# Patient Record
Sex: FEMALE | Race: WHITE | NOT HISPANIC OR LATINO | Employment: UNEMPLOYED | URBAN - METROPOLITAN AREA
[De-identification: names, ages, dates, MRNs, and addresses within clinical notes are randomized per-mention and may not be internally consistent; named-entity substitution may affect disease eponyms.]

---

## 2021-03-19 ENCOUNTER — IMMUNIZATION (OUTPATIENT)
Dept: PHARMACY | Facility: CLINIC | Age: 51
End: 2021-03-19

## 2021-03-19 DIAGNOSIS — Z23 NEED FOR VACCINATION: Primary | ICD-10-CM

## 2021-04-16 ENCOUNTER — IMMUNIZATION (OUTPATIENT)
Dept: PHARMACY | Facility: CLINIC | Age: 51
End: 2021-04-16

## 2021-04-16 DIAGNOSIS — Z23 NEED FOR VACCINATION: Primary | ICD-10-CM

## 2022-03-22 ENCOUNTER — HOSPITAL ENCOUNTER (EMERGENCY)
Facility: HOSPITAL | Age: 52
Discharge: HOME | End: 2022-03-22
Attending: EMERGENCY MEDICINE
Payer: OTHER GOVERNMENT

## 2022-03-22 VITALS
WEIGHT: 152 LBS | RESPIRATION RATE: 18 BRPM | OXYGEN SATURATION: 99 % | DIASTOLIC BLOOD PRESSURE: 67 MMHG | SYSTOLIC BLOOD PRESSURE: 136 MMHG | HEART RATE: 62 BPM | TEMPERATURE: 98 F | HEIGHT: 61 IN | BODY MASS INDEX: 28.7 KG/M2

## 2022-03-22 DIAGNOSIS — N93.9 VAGINAL BLEEDING: ICD-10-CM

## 2022-03-22 DIAGNOSIS — R05.9 COUGH: ICD-10-CM

## 2022-03-22 DIAGNOSIS — R42 DIZZINESS: ICD-10-CM

## 2022-03-22 DIAGNOSIS — J32.9 SINUSITIS, UNSPECIFIED CHRONICITY, UNSPECIFIED LOCATION: Primary | ICD-10-CM

## 2022-03-22 LAB
ALBUMIN SERPL BCP-MCNC: 4.1 G/DL (ref 3.5–5.2)
ALP SERPL-CCNC: 60 U/L (ref 55–135)
ALT SERPL W/O P-5'-P-CCNC: 22 U/L (ref 10–44)
ANION GAP SERPL CALC-SCNC: 10 MMOL/L (ref 8–16)
AST SERPL-CCNC: 28 U/L (ref 10–40)
B-HCG UR QL: NEGATIVE
BASOPHILS # BLD AUTO: 0.06 K/UL (ref 0–0.2)
BASOPHILS NFR BLD: 0.5 % (ref 0–1.9)
BILIRUB SERPL-MCNC: 0.2 MG/DL (ref 0.1–1)
BILIRUB UR QL STRIP: NEGATIVE
BNP SERPL-MCNC: 90 PG/ML (ref 0–99)
BUN SERPL-MCNC: 11 MG/DL (ref 6–20)
CALCIUM SERPL-MCNC: 8.9 MG/DL (ref 8.7–10.5)
CHLORIDE SERPL-SCNC: 104 MMOL/L (ref 95–110)
CLARITY UR: CLEAR
CO2 SERPL-SCNC: 25 MMOL/L (ref 23–29)
COLOR UR: COLORLESS
CREAT SERPL-MCNC: 0.7 MG/DL (ref 0.5–1.4)
CTP QC/QA: YES
DIFFERENTIAL METHOD: ABNORMAL
EOSINOPHIL # BLD AUTO: 0.2 K/UL (ref 0–0.5)
EOSINOPHIL NFR BLD: 1.8 % (ref 0–8)
ERYTHROCYTE [DISTWIDTH] IN BLOOD BY AUTOMATED COUNT: 13.4 % (ref 11.5–14.5)
EST. GFR  (AFRICAN AMERICAN): >60 ML/MIN/1.73 M^2
EST. GFR  (NON AFRICAN AMERICAN): >60 ML/MIN/1.73 M^2
GLUCOSE SERPL-MCNC: 98 MG/DL (ref 70–110)
GLUCOSE UR QL STRIP: NEGATIVE
HCT VFR BLD AUTO: 39.6 % (ref 37–48.5)
HGB BLD-MCNC: 12.7 G/DL (ref 12–16)
HGB UR QL STRIP: ABNORMAL
IMM GRANULOCYTES # BLD AUTO: 0.03 K/UL (ref 0–0.04)
IMM GRANULOCYTES NFR BLD AUTO: 0.2 % (ref 0–0.5)
KETONES UR QL STRIP: NEGATIVE
LEUKOCYTE ESTERASE UR QL STRIP: NEGATIVE
LYMPHOCYTES # BLD AUTO: 1.4 K/UL (ref 1–4.8)
LYMPHOCYTES NFR BLD: 11.1 % (ref 18–48)
MCH RBC QN AUTO: 26.7 PG (ref 27–31)
MCHC RBC AUTO-ENTMCNC: 32.1 G/DL (ref 32–36)
MCV RBC AUTO: 83 FL (ref 82–98)
MICROSCOPIC COMMENT: NORMAL
MONOCYTES # BLD AUTO: 0.6 K/UL (ref 0.3–1)
MONOCYTES NFR BLD: 4.6 % (ref 4–15)
NEUTROPHILS # BLD AUTO: 10.4 K/UL (ref 1.8–7.7)
NEUTROPHILS NFR BLD: 81.8 % (ref 38–73)
NITRITE UR QL STRIP: NEGATIVE
NRBC BLD-RTO: 0 /100 WBC
PH UR STRIP: 6 [PH] (ref 5–8)
PLATELET # BLD AUTO: 268 K/UL (ref 150–450)
PMV BLD AUTO: 11 FL (ref 9.2–12.9)
POC MOLECULAR INFLUENZA A AGN: NEGATIVE
POC MOLECULAR INFLUENZA B AGN: NEGATIVE
POTASSIUM SERPL-SCNC: 4.2 MMOL/L (ref 3.5–5.1)
PROT SERPL-MCNC: 8 G/DL (ref 6–8.4)
PROT UR QL STRIP: NEGATIVE
RBC # BLD AUTO: 4.76 M/UL (ref 4–5.4)
RBC #/AREA URNS HPF: 1 /HPF (ref 0–4)
SARS-COV-2 RDRP RESP QL NAA+PROBE: NEGATIVE
SODIUM SERPL-SCNC: 139 MMOL/L (ref 136–145)
SP GR UR STRIP: 1 (ref 1–1.03)
SQUAMOUS #/AREA URNS HPF: 2 /HPF
TROPONIN I SERPL DL<=0.01 NG/ML-MCNC: 0.01 NG/ML (ref 0–0.03)
URN SPEC COLLECT METH UR: ABNORMAL
UROBILINOGEN UR STRIP-ACNC: NEGATIVE EU/DL
WBC # BLD AUTO: 12.71 K/UL (ref 3.9–12.7)

## 2022-03-22 PROCEDURE — 87502 INFLUENZA DNA AMP PROBE: CPT

## 2022-03-22 PROCEDURE — 93005 ELECTROCARDIOGRAM TRACING: CPT

## 2022-03-22 PROCEDURE — 84484 ASSAY OF TROPONIN QUANT: CPT | Performed by: PHYSICIAN ASSISTANT

## 2022-03-22 PROCEDURE — 81025 URINE PREGNANCY TEST: CPT | Performed by: PHYSICIAN ASSISTANT

## 2022-03-22 PROCEDURE — 80053 COMPREHEN METABOLIC PANEL: CPT | Performed by: PHYSICIAN ASSISTANT

## 2022-03-22 PROCEDURE — U0002 COVID-19 LAB TEST NON-CDC: HCPCS | Performed by: PHYSICIAN ASSISTANT

## 2022-03-22 PROCEDURE — 96360 HYDRATION IV INFUSION INIT: CPT

## 2022-03-22 PROCEDURE — 25000003 PHARM REV CODE 250: Performed by: PHYSICIAN ASSISTANT

## 2022-03-22 PROCEDURE — 83880 ASSAY OF NATRIURETIC PEPTIDE: CPT | Performed by: PHYSICIAN ASSISTANT

## 2022-03-22 PROCEDURE — 81000 URINALYSIS NONAUTO W/SCOPE: CPT | Performed by: PHYSICIAN ASSISTANT

## 2022-03-22 PROCEDURE — 99285 EMERGENCY DEPT VISIT HI MDM: CPT | Mod: 25

## 2022-03-22 PROCEDURE — 93010 ELECTROCARDIOGRAM REPORT: CPT | Mod: ,,, | Performed by: INTERNAL MEDICINE

## 2022-03-22 PROCEDURE — 85025 COMPLETE CBC W/AUTO DIFF WBC: CPT | Performed by: PHYSICIAN ASSISTANT

## 2022-03-22 PROCEDURE — 93010 EKG 12-LEAD: ICD-10-PCS | Mod: ,,, | Performed by: INTERNAL MEDICINE

## 2022-03-22 RX ORDER — AMOXICILLIN AND CLAVULANATE POTASSIUM 875; 125 MG/1; MG/1
1 TABLET, FILM COATED ORAL 2 TIMES DAILY
Qty: 14 TABLET | Refills: 0 | Status: SHIPPED | OUTPATIENT
Start: 2022-03-22

## 2022-03-22 RX ADMIN — SODIUM CHLORIDE 1000 ML: 0.9 INJECTION, SOLUTION INTRAVENOUS at 12:03

## 2022-03-22 NOTE — ED TRIAGE NOTES
Pt. Came in complaining of dizziness that started at approximately 0845 this morning. Pt. States that she was watching the kids at the  when her dizziness started. Pt. Complains of menstrual pain. Pt. States that she is on her cycle and is bleeding heavier than normal. Pt. States that she does have fibroids and is due to have the surgery next month.

## 2022-03-22 NOTE — ED PROVIDER NOTES
Encounter Date: 3/22/2022    SCRIBE #1 NOTE: I, Leyla Stephens, am scribing for, and in the presence of,  Lola Berry PA-C. I have scribed the following portions of the note - Other sections scribed: HPI, ROS.       History     Chief Complaint   Patient presents with    Dizziness     Started this am    Cough     Nasal congestion     This 51 y.o female, with no medical history, presents to the ED c/o acute dizziness that began at 8:45 am this morning. Pt reports feeling as though she is going to pass out, noting that it has been constant since onset. She states that she has been experiencing vaginal bleeding intermittently every 2 weeks since 2021, and notes that the bleeding is typically heavy (saturating sanitary pads every 1.5 hours) for one day and improves the following day, but returns. The bleeding is improved today. She notes that she was recently found to have fibroids and is scheduled to undergo surgery to have them removed on 2022. Additionally, pt c/o a productive cough with green sputum, rhinorrhea, nasal congestion, headache and mild shortness of breath for the last 5 days. She reports taking Zyrtec and Claritin for treatment with minimal improvement. Pt notes that she usually experiences allergy issues with a change in weather. No recent sick contacts. She is vaccinated for COVID-19. She denies fever, appetite change, ear pain, chest pain, abdominal pain, nausea, or emesis. No other associated symptoms.     The history is provided by the patient.     Review of patient's allergies indicates:  No Known Allergies  History reviewed. No pertinent past medical history.  Past Surgical History:   Procedure Laterality Date     SECTION, CLASSIC  2010     History reviewed. No pertinent family history.  Social History     Tobacco Use    Smoking status: Never Smoker   Substance Use Topics    Alcohol use: No    Drug use: No     Review of Systems   Constitutional: Negative for  appetite change and fever.   HENT: Positive for congestion (nasal) and rhinorrhea.    Respiratory: Positive for cough (productive with green sputum) and shortness of breath (mild).    Cardiovascular: Negative for chest pain.   Gastrointestinal: Negative for abdominal pain, nausea and vomiting.   Genitourinary: Positive for vaginal bleeding.   Neurological: Positive for dizziness and headaches.       Physical Exam     Initial Vitals [03/22/22 1140]   BP Pulse Resp Temp SpO2   (!) 168/74 73 18 98.2 °F (36.8 °C) 99 %      MAP       --         Physical Exam    ED Course   Procedures  Labs Reviewed   CBC W/ AUTO DIFFERENTIAL - Abnormal; Notable for the following components:       Result Value    WBC 12.71 (*)     MCH 26.7 (*)     Gran # (ANC) 10.4 (*)     Gran % 81.8 (*)     Lymph % 11.1 (*)     All other components within normal limits   URINALYSIS, REFLEX TO URINE CULTURE - Abnormal; Notable for the following components:    Color, UA Colorless (*)     Occult Blood UA 3+ (*)     All other components within normal limits    Narrative:     Specimen Source->Urine   COMPREHENSIVE METABOLIC PANEL   TROPONIN I   B-TYPE NATRIURETIC PEPTIDE   URINALYSIS MICROSCOPIC    Narrative:     Specimen Source->Urine   SARS-COV-2 RDRP GENE   POCT URINE PREGNANCY   POCT INFLUENZA A/B MOLECULAR   SARS-COV-2 RDRP GENE     EKG Readings: (Independently Interpreted)   Initial Reading: No STEMI. Rhythm: Normal Sinus Rhythm. Heart Rate: 69. Ectopy: No Ectopy. Conduction: Normal.       Imaging Results          X-Ray Chest PA And Lateral (Final result)  Result time 03/22/22 12:25:21    Final result by Rob Núñez MD (03/22/22 12:25:21)                 Impression:      No acute process.      Electronically signed by: Rob Núñez MD  Date:    03/22/2022  Time:    12:25             Narrative:    EXAMINATION:  XR CHEST PA AND LATERAL    CLINICAL HISTORY:  Cough, unspecified    TECHNIQUE:  PA and lateral views of the chest were  performed.    COMPARISON:  None    FINDINGS:  The trachea is unremarkable.  The cardiomediastinal silhouette is within normal limits.  The hemidiaphragms are unremarkable.  There are no pleural effusions.  There is no evidence of a pneumothorax.  There is no evidence of pneumomediastinum.  No airspace opacity is present.  The osseous structures are unremarkable.                                 Medications   sodium chloride 0.9% bolus 1,000 mL (0 mLs Intravenous Stopped 3/22/22 1330)           APC / Resident Notes:   This is an urgent evaluation of a 51-year-old female who presents to the emergency department complaining of dizziness, vaginal bleeding and upper respiratory symptoms.    The patient is currently afebrile and nontoxic in appearance.  Vital signs are stable.  On physical exam, there is bilateral nasal congestion noted.  There is mild posterior pharyngeal erythema without tonsillar exudates.  There is no evidence of peritonsillar abscess.  The bilateral lungs are clear to auscultation.  There is no abdominal tenderness, rebound or guarding.  The patient deferred a pelvic exam reporting that vaginal bleeding improved.    Orthostatic blood pressures were performed which were within normal limits.  Rapid influenza and COVID screening were done which were negative.  Pregnancy test is negative.  CBC is without any anemia.  Her white blood cell count is mildly elevated at 12.71.  BNP and troponin are negative.  Chest x-ray revealed no acute cardiopulmonary processes.  EKG was performed which revealed a ventricular rate of 69 with a normal sinus rhythm.  No evidence of ST elevation MI.  urinalysis revealed no evidence of urinary tract infection.    At this time, no anemia is noted for a potential transfusion.  I will treat her sinusitis with antibiotics.  Her dizziness is likely due to an upper respiratory infection and not cardiac in nature.  Signs and symptoms of worsening were thoroughly reviewed with her and  she verbalized understanding and agreement.  She is currently safe and stable for discharge at this time.     Scribe Attestation:   Scribe #1: I performed the above scribed service and the documentation accurately describes the services I performed. I attest to the accuracy of the note.                 Clinical Impression:   Final diagnoses:  [R42] Dizziness  [R05.9] Cough  [J32.9] Sinusitis, unspecified chronicity, unspecified location (Primary)  [N93.9] Vaginal bleeding          ED Disposition Condition    Discharge Stable        ED Prescriptions     Medication Sig Dispense Start Date End Date Auth. Provider    amoxicillin-clavulanate 875-125mg (AUGMENTIN) 875-125 mg per tablet Take 1 tablet by mouth 2 (two) times daily. 14 tablet 3/22/2022  Lola Berry PA-C        Follow-up Information    None          I, Lola Berry PA-C, personally performed the services described in this documentation. All medical record entries made by the scribe were at my direction and in my presence. I have reviewed the chart and agree that the record reflects my personal performance and is accurate and complete.     Lola Berry PA-C  03/22/22 7014

## 2022-03-22 NOTE — DISCHARGE INSTRUCTIONS
Drink plenty of fluids.  Rest.  Take all antibiotics as prescribed until completed.  Return to the emergency department for any change or concerning symptoms.  Please call make a follow-up appointment with your primary care doctor.

## 2024-02-13 ENCOUNTER — HOSPITAL ENCOUNTER (EMERGENCY)
Facility: HOSPITAL | Age: 54
Discharge: HOME | End: 2024-02-13
Attending: EMERGENCY MEDICINE
Payer: OTHER GOVERNMENT

## 2024-02-13 VITALS
SYSTOLIC BLOOD PRESSURE: 132 MMHG | WEIGHT: 156 LBS | OXYGEN SATURATION: 99 % | HEART RATE: 71 BPM | TEMPERATURE: 99 F | RESPIRATION RATE: 18 BRPM | BODY MASS INDEX: 29.45 KG/M2 | DIASTOLIC BLOOD PRESSURE: 67 MMHG | HEIGHT: 61 IN

## 2024-02-13 DIAGNOSIS — M54.50 ACUTE RIGHT-SIDED LOW BACK PAIN WITHOUT SCIATICA: Primary | ICD-10-CM

## 2024-02-13 LAB
BILIRUB UR QL STRIP: NEGATIVE
CLARITY UR: CLEAR
COLOR UR: YELLOW
GLUCOSE UR QL STRIP: NEGATIVE
HGB UR QL STRIP: NEGATIVE
KETONES UR QL STRIP: NEGATIVE
LEUKOCYTE ESTERASE UR QL STRIP: NEGATIVE
NITRITE UR QL STRIP: NEGATIVE
PH UR STRIP: 5 [PH] (ref 5–8)
PROT UR QL STRIP: NEGATIVE
SP GR UR STRIP: 1.01 (ref 1–1.03)
URN SPEC COLLECT METH UR: NORMAL
UROBILINOGEN UR STRIP-ACNC: NEGATIVE EU/DL

## 2024-02-13 PROCEDURE — 81003 URINALYSIS AUTO W/O SCOPE: CPT

## 2024-02-13 PROCEDURE — 25000003 PHARM REV CODE 250

## 2024-02-13 PROCEDURE — 99284 EMERGENCY DEPT VISIT MOD MDM: CPT

## 2024-02-13 RX ORDER — MELOXICAM 15 MG/1
15 TABLET ORAL DAILY
Qty: 20 TABLET | Refills: 0 | Status: SHIPPED | OUTPATIENT
Start: 2024-02-13

## 2024-02-13 RX ORDER — CYCLOBENZAPRINE HCL 5 MG
5 TABLET ORAL 3 TIMES DAILY PRN
Qty: 20 TABLET | Refills: 0 | Status: SHIPPED | OUTPATIENT
Start: 2024-02-13 | End: 2024-02-23

## 2024-02-13 RX ORDER — LIDOCAINE 50 MG/G
1 PATCH TOPICAL ONCE
Qty: 15 PATCH | Refills: 0 | Status: SHIPPED | OUTPATIENT
Start: 2024-02-13 | End: 2024-02-13

## 2024-02-13 RX ORDER — MELOXICAM 7.5 MG/1
15 TABLET ORAL
Status: COMPLETED | OUTPATIENT
Start: 2024-02-13 | End: 2024-02-13

## 2024-02-13 RX ADMIN — MELOXICAM 15 MG: 7.5 TABLET ORAL at 12:02

## 2024-02-13 NOTE — ED PROVIDER NOTES
Encounter Date: 2024       History     Chief Complaint   Patient presents with    Back Pain     Pt reporting right lower back pain x Tuesday last week with no relief from Tylenol. Pt denies  changes.      53 y.o. female presenting with one week history of constant 5/10 atraumatic right lower back pain. Pain is worse with movement.  She denies any associated trauma, fall, head trauma, loss of consciousness.  Patient reports she works at a  and has to  toddlers. No fever, chest pain, nausea, vomiting, dysuria, vaginal discharge, saddle anesthesia, urinary/bowel incontinence or further complaints. No IVDU. No recent steroid use. No hx of cancer. Patient attempted treatment with Tylenol and lidocaine patch with some relief.     The history is provided by the patient. No  was used.     Review of patient's allergies indicates:  No Known Allergies  History reviewed. No pertinent past medical history.  Past Surgical History:   Procedure Laterality Date     SECTION, CLASSIC  2010     No family history on file.  Social History     Tobacco Use    Smoking status: Never   Substance Use Topics    Alcohol use: No    Drug use: No     Review of Systems   Constitutional:  Negative for chills and fever.   HENT:  Negative for congestion, ear pain, rhinorrhea and sore throat.    Eyes:  Negative for redness.   Respiratory:  Negative for cough and shortness of breath.    Cardiovascular:  Negative for chest pain.   Gastrointestinal:  Negative for abdominal pain, diarrhea, nausea and vomiting.   Genitourinary:  Negative for decreased urine volume, difficulty urinating, dysuria, frequency, hematuria and urgency.        (-) bladder/bowel incontinence  (-) saddle anesthesia   Musculoskeletal:  Positive for back pain. Negative for neck pain.   Skin:  Negative for rash.   Neurological:  Negative for dizziness, weakness, numbness and headaches.        (-) head trauma  (-) LOC   Hematological:  Does  not bruise/bleed easily.   Psychiatric/Behavioral:  Negative for confusion.        Physical Exam     Initial Vitals [02/13/24 1049]   BP Pulse Resp Temp SpO2   (!) 140/61 70 18 98.8 °F (37.1 °C) 100 %      MAP       --         Physical Exam    Nursing note and vitals reviewed.  Constitutional: She appears well-developed and well-nourished.  Non-toxic appearance. She does not appear ill.   HENT:   Head: Normocephalic and atraumatic.   Right Ear: Hearing, tympanic membrane, external ear and ear canal normal. Tympanic membrane is not perforated, not erythematous and not bulging.   Left Ear: Hearing, tympanic membrane, external ear and ear canal normal. Tympanic membrane is not perforated, not erythematous and not bulging.   Nose: Nose normal.   Mouth/Throat: Uvula is midline, oropharynx is clear and moist and mucous membranes are normal.   Eyes: Conjunctivae and EOM are normal. Pupils are equal, round, and reactive to light.   Neck: Neck supple.   Normal range of motion.   Full passive range of motion without pain.     Cardiovascular:  Normal rate, regular rhythm and normal heart sounds.           Pulses:       Radial pulses are 2+ on the right side and 2+ on the left side.   Pulmonary/Chest: Effort normal and breath sounds normal. No accessory muscle usage. No respiratory distress. She has no decreased breath sounds.   Abdominal: Abdomen is soft. Bowel sounds are normal. She exhibits no distension. There is no abdominal tenderness.   No right CVA tenderness.  No left CVA tenderness. There is no rebound and no guarding.   Musculoskeletal:         General: Normal range of motion.      Cervical back: Full passive range of motion without pain, normal range of motion and neck supple. No rigidity.      Comments: Full ROM of neck. No neck rigidity. No midline tenderness to cervical, thoracic, or lumbar spine.  No bony step-offs.  Full range of motion of bilateral upper and lower extremities.  Strength and sensation intact  bilateral upper and lower extremities.  Patient able to ambulate into the room.  No erythema, edema, bruising, rash, or cellulitis patient's back.      Neurological: She is alert and oriented to person, place, and time. She has normal strength. No cranial nerve deficit.   Neuro intact.  Strength and sensation intact bilateral upper and lower extremities.   Skin: Skin is warm and dry.   Psychiatric: She has a normal mood and affect. Her behavior is normal. Judgment and thought content normal.         ED Course   Procedures  Labs Reviewed   URINALYSIS, REFLEX TO URINE CULTURE    Narrative:     Specimen Source->Urine          Imaging Results    None          Medications   meloxicam tablet 15 mg (15 mg Oral Given 2/13/24 9198)     Medical Decision Making  This is a 53 y.o. female presenting with one week history of constant 5/10 atraumatic right lower back pain. Pain is worse with movement.  On physical exam, patient is well-appearing and in no acute distress.  Nontoxic appearing.  Lungs are clear to auscultation bilaterally.  Abdomen is soft and nontender.  No guarding, rigidity, rebound.  2+ radial pulses bilaterally.  Posterior oropharynx is not erythematous.  No edema or exudate.  Uvula midline.  Bilateral tympanic membrane is normal.  No erythema, bulging, or perforations.  Neuro intact.  Strength and sensation intact bilateral upper and lower extremities. Full ROM of neck. No neck rigidity. No midline tenderness to cervical, thoracic, or lumbar spine.  No bony step-offs.  Full range of motion of bilateral upper and lower extremities.  Strength and sensation intact bilateral upper and lower extremities.  Patient able to ambulate into the room.  No erythema, edema, bruising, rash, or cellulitis patient's back.  No CVA tenderness bilaterally.  UA unremarkable.  Doubt cystitis.  I believe patient's symptoms are musculoskeletal in nature.  She is ambulating well and moving her extremities well.  She denies any direct  trauma.  I do not believe she needs any imaging at this time.  Will discharge patient on Mobic, Flexeril, and Lidoderm patches.  Urged prompt follow-up with PCP for further evaluation.    Strict return precautions given. I discussed with the patient/family the diagnosis, treatment plan, indications for return to the emergency department, and for expected follow-up. The patient/family verbalized an understanding. The patient/family is asked if there are any questions or concerns. We discuss the case, until all issues are addressed to the patient/family's satisfaction. Patient/family understands and is agreeable to the plan. Patient is stable and ready for discharge.      Risk  Prescription drug management.                                      Clinical Impression:  Final diagnoses:  [M54.50] Acute right-sided low back pain without sciatica (Primary)          ED Disposition Condition    Discharge Stable          ED Prescriptions       Medication Sig Dispense Start Date End Date Auth. Provider    meloxicam (MOBIC) 15 MG tablet Take 1 tablet (15 mg total) by mouth once daily. 20 tablet 2/13/2024 -- Vignesh Middleton PA-C    cyclobenzaprine (FLEXERIL) 5 MG tablet Take 1 tablet (5 mg total) by mouth 3 (three) times daily as needed for Muscle spasms. 20 tablet 2/13/2024 2/23/2024 Vignesh Middleton PA-C    LIDOcaine (LIDODERM) 5 % (Expires today) Place 1 patch onto the skin once. Remove & Discard patch within 12 hours or as directed by MD for 1 dose 15 patch 2/13/2024 2/13/2024 Vignesh Middleton PA-C          Follow-up Information       Follow up With Specialties Details Why Contact Info    [facility] -  Schedule an appointment as soon as possible for a visit in 2 days for further evaluation [address] [place] [address]  [phone]      [facility] In 2 days If symptoms worsen [address]  [facility] [place]  [address]  [phone]             Vignesh Middleton PA-C  02/13/24 161

## 2024-02-13 NOTE — Clinical Note
"Alivia"CAPO Rey was seen and treated in our emergency department on 2/13/2024.  She may return to work on 02/15/2024.       If you have any questions or concerns, please don't hesitate to call.      Vignesh Middleton PA-C"

## 2024-02-13 NOTE — DISCHARGE INSTRUCTIONS
Please return to the Emergency Department for any new or worsening symptoms including: bladder/bowel incontinence, saddle anesthesia, fever, chest pain, shortness of breath, loss of consciousness, dizziness, weakness, or any other concerns.     Please follow up with your Primary Care Provider within in the week. If you do not have one, you may contact the one listed on your discharge paperwork or you may also call the [facility] Appointment Desk at [phone] to schedule an appointment with one.     Please take all medication as prescribed.     
Pt stated that she has vaginal piercing

## 2024-02-13 NOTE — ED TRIAGE NOTES
Pt presents to ED via POV with c/o RIGHT lower back pain that radiates to buttocks x1 week. States has had this hap[pen before and was able to resolve with tylenol, but not helping this time. 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 Denies injury/trauma. Took IBU this morning without improvement. Denies urinary symptoms.

## 2024-12-04 ENCOUNTER — HOSPITAL ENCOUNTER (EMERGENCY)
Facility: HOSPITAL | Age: 54
Discharge: HOME | End: 2024-12-04
Attending: INTERNAL MEDICINE
Payer: OTHER GOVERNMENT

## 2024-12-04 VITALS
WEIGHT: 156 LBS | BODY MASS INDEX: 29.45 KG/M2 | HEIGHT: 61 IN | TEMPERATURE: 98 F | SYSTOLIC BLOOD PRESSURE: 119 MMHG | DIASTOLIC BLOOD PRESSURE: 62 MMHG | OXYGEN SATURATION: 100 % | RESPIRATION RATE: 18 BRPM | HEART RATE: 80 BPM

## 2024-12-04 DIAGNOSIS — R42 DIZZINESS: ICD-10-CM

## 2024-12-04 DIAGNOSIS — R42 LIGHTHEADEDNESS: Primary | ICD-10-CM

## 2024-12-04 LAB
ALBUMIN SERPL BCP-MCNC: 3.5 G/DL (ref 3.5–5.2)
ALP SERPL-CCNC: 60 U/L (ref 40–150)
ALT SERPL W/O P-5'-P-CCNC: 19 U/L (ref 10–44)
ANION GAP SERPL CALC-SCNC: 9 MMOL/L (ref 8–16)
AST SERPL-CCNC: 17 U/L (ref 10–40)
BASOPHILS # BLD AUTO: 0.04 K/UL (ref 0–0.2)
BASOPHILS NFR BLD: 0.5 % (ref 0–1.9)
BILIRUB SERPL-MCNC: 0.3 MG/DL (ref 0.1–1)
BUN SERPL-MCNC: 12 MG/DL (ref 6–20)
CALCIUM SERPL-MCNC: 9.1 MG/DL (ref 8.7–10.5)
CHLORIDE SERPL-SCNC: 105 MMOL/L (ref 95–110)
CO2 SERPL-SCNC: 26 MMOL/L (ref 23–29)
CREAT SERPL-MCNC: 0.7 MG/DL (ref 0.5–1.4)
DIFFERENTIAL METHOD BLD: NORMAL
EOSINOPHIL # BLD AUTO: 0.3 K/UL (ref 0–0.5)
EOSINOPHIL NFR BLD: 3.9 % (ref 0–8)
ERYTHROCYTE [DISTWIDTH] IN BLOOD BY AUTOMATED COUNT: 12.8 % (ref 11.5–14.5)
EST. GFR  (NO RACE VARIABLE): >60 ML/MIN/1.73 M^2
GLUCOSE SERPL-MCNC: 99 MG/DL (ref 70–110)
HCT VFR BLD AUTO: 40.1 % (ref 37–48.5)
HGB BLD-MCNC: 13.6 G/DL (ref 12–16)
IMM GRANULOCYTES # BLD AUTO: 0.02 K/UL (ref 0–0.04)
IMM GRANULOCYTES NFR BLD AUTO: 0.2 % (ref 0–0.5)
LYMPHOCYTES # BLD AUTO: 1.8 K/UL (ref 1–4.8)
LYMPHOCYTES NFR BLD: 21.7 % (ref 18–48)
MAGNESIUM SERPL-MCNC: 1.9 MG/DL (ref 1.6–2.6)
MCH RBC QN AUTO: 28.3 PG (ref 27–31)
MCHC RBC AUTO-ENTMCNC: 33.9 G/DL (ref 32–36)
MCV RBC AUTO: 83 FL (ref 82–98)
MONOCYTES # BLD AUTO: 0.8 K/UL (ref 0.3–1)
MONOCYTES NFR BLD: 9.2 % (ref 4–15)
NEUTROPHILS # BLD AUTO: 5.3 K/UL (ref 1.8–7.7)
NEUTROPHILS NFR BLD: 64.5 % (ref 38–73)
NRBC BLD-RTO: 0 /100 WBC
PHOSPHATE SERPL-MCNC: 3.2 MG/DL (ref 2.7–4.5)
PLATELET # BLD AUTO: 227 K/UL (ref 150–450)
PMV BLD AUTO: 10.7 FL (ref 9.2–12.9)
POCT GLUCOSE: 110 MG/DL (ref 70–110)
POTASSIUM SERPL-SCNC: 4.2 MMOL/L (ref 3.5–5.1)
PROT SERPL-MCNC: 6.8 G/DL (ref 6–8.4)
RBC # BLD AUTO: 4.81 M/UL (ref 4–5.4)
SODIUM SERPL-SCNC: 140 MMOL/L (ref 136–145)
TROPONIN I SERPL DL<=0.01 NG/ML-MCNC: <0.006 NG/ML (ref 0–0.03)
WBC # BLD AUTO: 8.26 K/UL (ref 3.9–12.7)

## 2024-12-04 PROCEDURE — 84484 ASSAY OF TROPONIN QUANT: CPT | Performed by: PHYSICIAN ASSISTANT

## 2024-12-04 PROCEDURE — 93005 ELECTROCARDIOGRAM TRACING: CPT

## 2024-12-04 PROCEDURE — 83735 ASSAY OF MAGNESIUM: CPT | Performed by: PHYSICIAN ASSISTANT

## 2024-12-04 PROCEDURE — 93010 ELECTROCARDIOGRAM REPORT: CPT | Mod: ,,, | Performed by: INTERNAL MEDICINE

## 2024-12-04 PROCEDURE — 99285 EMERGENCY DEPT VISIT HI MDM: CPT | Mod: 25

## 2024-12-04 PROCEDURE — 80053 COMPREHEN METABOLIC PANEL: CPT | Performed by: PHYSICIAN ASSISTANT

## 2024-12-04 PROCEDURE — 85025 COMPLETE CBC W/AUTO DIFF WBC: CPT | Performed by: PHYSICIAN ASSISTANT

## 2024-12-04 PROCEDURE — 84100 ASSAY OF PHOSPHORUS: CPT | Performed by: PHYSICIAN ASSISTANT

## 2024-12-04 PROCEDURE — 82962 GLUCOSE BLOOD TEST: CPT

## 2024-12-04 RX ORDER — MECLIZINE HYDROCHLORIDE 25 MG/1
25 TABLET ORAL 3 TIMES DAILY PRN
Qty: 20 TABLET | Refills: 0 | Status: SHIPPED | OUTPATIENT
Start: 2024-12-04

## 2024-12-04 NOTE — Clinical Note
"Alivia"CAPO Rey was seen and treated in our emergency department on 12/4/2024.  She may return to work on 12/09/2024.       If you have any questions or concerns, please don't hesitate to call.      Jyoti Wright PA-C"

## 2024-12-05 LAB
OHS QRS DURATION: 84 MS
OHS QTC CALCULATION: 432 MS

## 2024-12-05 NOTE — ED PROVIDER NOTES
Encounter Date: 2024       History     Chief Complaint   Patient presents with    Dizziness     Reports intermittent dizziness today, denies change in vision, reports diarrhea, denies CP or SOB     Patient is a 54-year-old female who presents to the emergency department with dizziness and lightheadedness.  Patient reports at 11:00 a.m. she was at work.  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  Reports she bent down and when she stood up she felt not right.  Reports she is not able to describe if it was true dizziness or lightheadedness.  Reports she felt generalized weakness.  Reports she went home and took a nap.  Reports when she woke up she still was not feeling right so she decided to come to the emergency department.  Denies visual disruption, facial asymmetry, speech slur, extremity weakness.  Denies abnormal gait.  Denies tinnitus.  Denies nausea or vomiting.  Does reports she has been having multiple episodes of diarrhea over the last couple of days.  Denies blood or mucus in stool.    The history is provided by the patient.     Review of patient's allergies indicates:  No Known Allergies  No past medical history on file.  Past Surgical History:   Procedure Laterality Date     SECTION, CLASSIC       No family history on file.  Social History     Tobacco Use    Smoking status: Never   Substance Use Topics    Alcohol use: No    Drug use: No     Review of Systems   Constitutional:  Negative for activity change, appetite change, chills, fatigue and fever.   HENT:  Negative for congestion, ear discharge, ear pain, nosebleeds, postnasal drip, rhinorrhea, sore throat and trouble swallowing.    Respiratory:  Negative for cough and shortness of breath.    Cardiovascular:  Negative for chest pain.   Gastrointestinal:  Positive for diarrhea. Negative for abdominal pain, blood in stool, constipation, nausea and vomiting.   Genitourinary:  Negative for dysuria, flank pain and  hematuria.   Musculoskeletal:  Negative for back pain, neck pain and neck stiffness.   Skin:  Negative for rash and wound.   Neurological:  Positive for dizziness, weakness (generalized) and light-headedness. Negative for facial asymmetry, speech difficulty, numbness and headaches.       Physical Exam     Initial Vitals [12/04/24 1953]   BP Pulse Resp Temp SpO2   120/62 73 18 98 °F (36.7 °C) 99 %      MAP       --         Physical Exam    Nursing note and vitals reviewed.  Constitutional: Vital signs are normal. She appears well-developed and well-nourished. She is not diaphoretic.  Non-toxic appearance. No distress.   HENT:   Head: Normocephalic.   Right Ear: Hearing and external ear normal.   Left Ear: Hearing and external ear normal.   Nose: Nose normal. Mouth/Throat: Uvula is midline, oropharynx is clear and moist and mucous membranes are normal. No oropharyngeal exudate.   Eyes: Conjunctivae and EOM are normal. Pupils are equal, round, and reactive to light.   Neck:   Normal range of motion.  Cardiovascular:  Normal rate and regular rhythm.           Pulmonary/Chest: Breath sounds normal.   Abdominal: Abdomen is soft. Bowel sounds are normal. There is no abdominal tenderness.   Musculoskeletal:         General: Normal range of motion.      Cervical back: Normal range of motion.     Neurological: She is alert and oriented to person, place, and time. She has normal strength. No cranial nerve deficit or sensory deficit. GCS score is 15. GCS eye subscore is 4. GCS verbal subscore is 5. GCS motor subscore is 6.   Normal finger-to-nose.  Normal rapid hand movements.  Negative pronator drift.  Normal gait.  Equal strength bilaterally in all extremities.   Skin: Skin is warm and dry. Capillary refill takes less than 2 seconds.   Psychiatric: She has a normal mood and affect.         ED Course   Procedures  Labs Reviewed   CBC W/ AUTO DIFFERENTIAL   COMPREHENSIVE METABOLIC PANEL   MAGNESIUM   PHOSPHORUS   TROPONIN I  "  POCT GLUCOSE MONITORING CONTINUOUS          Imaging Results    None          Medications - No data to display  Medical Decision Making  Urgent evaluation of a 54-year-old female who presents to the emergency department with dizziness and lightheadedness.  Patient is afebrile, nontoxic-appearing, and hemodynamically stable.  On exam, patient appears well.  She is asymptomatic currently.  Normal neuro exam.  No focal neurological deficits.  No speech slur, facial asymmetry, or extremity weakness.  No nystagmus.  No nuchal rigidity.  Normal gait.    With patient's report of diarrhea over the last couple of days, I suspect some mild dehydration causing some orthostatic symptoms.  Her presentation is not consistent with central etiology.  Will obtain labs and head CT.  She is asymptomatic currently so will not give any medications.    11:24 PM  Labs reveal no significant anemia, kidney insufficiency, or electrolyte disruption.  Patient is not orthostatic.  Troponin is undetectable.  Glucose is normal.  CT head shows no acute intracranial process.  Patient remains asymptomatic here.  Vasovagal response versus benign positional vertigo most likely.  On repeat exam, I do notice that patient has eyebrows are not perfectly symmetrical.  When I question her  about this he reports this is her normal "mean face" appearance :).  He states her face definitely looks baseline for her.  Discussed patient in depth with supervising physician who does not advised further MRI or CVA workup.  Patient feels comfortable with discharge.  She is requesting meclizine prescription.  Will place ENT and neurology referral.  Advised to return to the emergency department with any worsening symptoms or concerns.    Amount and/or Complexity of Data Reviewed  Labs: ordered.  Radiology: ordered.                                      Clinical Impression:  Final diagnoses:  [R42] Dizziness                 Kalie-Jyoti Christian, " VIDAL  12/04/24 1396

## 2024-12-05 NOTE — ED TRIAGE NOTES
Pt presents to the ED with complaints of intermittent dizziness since 11am. Pt reports at work when episodes starting. Pt denies fainting, chest pain, nausea and vomiting. Pt AAOx4, no acute distress noted.

## 2024-12-05 NOTE — DISCHARGE INSTRUCTIONS
As we discussed, it is important that you follow up with primary care provider, ENT, and Neurology.  If your symptoms come back return to the emergency department.  Thank you for allowing us to take care of you today.

## 2025-03-14 ENCOUNTER — TELEPHONE (OUTPATIENT)
Dept: ORTHOPEDICS | Facility: CLINIC | Age: 55
End: 2025-03-14

## 2025-03-14 NOTE — TELEPHONE ENCOUNTER
Pt states that she needs an appointment with occupational therapy   ----- Message from Yisel sent at 3/13/2025  5:32 PM CDT -----  Regarding: Igor  Patient is requesting a sooner appointment.  Patient declined first available appointment listed as well as another facility and provider .  Patient will not accept being placed on the waitlist and is requesting a message be sent to doctor.Name of Caller: Jaden is the first available appointment? No available apptsSymptoms: Left elbow painWould the patient rather a call back or a response via [facility]? [facility] Number: [phone] Information:

## 2025-05-07 ENCOUNTER — OFFICE VISIT (OUTPATIENT)
Dept: PODIATRY | Facility: CLINIC | Age: 55
End: 2025-05-07
Payer: OTHER GOVERNMENT

## 2025-05-07 VITALS — BODY MASS INDEX: 29.47 KG/M2 | HEIGHT: 61 IN | WEIGHT: 156.06 LBS

## 2025-05-07 DIAGNOSIS — M72.2 PLANTAR FASCIITIS: Primary | ICD-10-CM

## 2025-05-07 DIAGNOSIS — M79.671 RIGHT FOOT PAIN: ICD-10-CM

## 2025-05-07 PROCEDURE — 99999 PR PBB SHADOW E&M-EST. PATIENT-LVL II: CPT | Mod: PBBFAC,,, | Performed by: PODIATRIST

## 2025-05-07 PROCEDURE — 99212 OFFICE O/P EST SF 10 MIN: CPT | Mod: PBBFAC,PO | Performed by: PODIATRIST

## 2025-05-07 PROCEDURE — 99203 OFFICE O/P NEW LOW 30 MIN: CPT | Mod: S$PBB,,, | Performed by: PODIATRIST

## 2025-05-07 RX ORDER — MELOXICAM 15 MG/1
15 TABLET ORAL DAILY
Qty: 20 TABLET | Refills: 0 | Status: SHIPPED | OUTPATIENT
Start: 2025-05-07

## 2025-05-07 NOTE — PROGRESS NOTES
Subjective:     Patient ID: Alivia Rey is a 54 y.o. female.    Chief Complaint: Foot Pain    Alivia is a 54 y.o. female who presents to the podiatry clinic  with complaint of  right foot pain. Onset of the symptoms was several weeks ago. Precipitating event: none known. Current symptoms include: ability to bear weight, but with some pain. Aggravating factors: any weight bearing. Symptoms have progressed to a point and plateaued. Patient has had no prior foot problems. Evaluation to date: none. Treatment to date: none.     Review of Systems   Constitutional: Negative for chills.   Cardiovascular:  Negative for chest pain and claudication.   Respiratory:  Negative for cough.    Skin:  Positive for color change, dry skin and nail changes.   Musculoskeletal:  Positive for joint pain.   Gastrointestinal:  Negative for nausea.   Neurological:  Positive for paresthesias. Negative for numbness.   Psychiatric/Behavioral:  The patient is not nervous/anxious.         Objective:     Physical Exam  Constitutional:       Appearance: She is well-developed.      Comments: Oriented to time, place, and person.   Cardiovascular:      Comments: DP and PT pulses are palpable bilaterally. 3 sec capillary refill time and toes and feet are warm to touch proximally .  There is  hair growth on the feet and toes b/l. There is no edema b/l. No spider veins or varicosities present b/l.     Musculoskeletal:      Comments: Equinus noted b/l ankles with < 10 deg DF noted. MMT 5/5 in DF/PF/Inv/Ev resistance with no reproduction of pain in any direction. Passive range of motion of ankle and pedal joints is painless b/l.  Pain on palpation plantar medial right heel, no pain with ROM or MMT or medial and lateral compression of heel, - tinel's sign     Feet:      Right foot:      Skin integrity: No callus or dry skin.      Left foot:      Skin integrity: No callus or dry skin.   Lymphadenopathy:      Comments: Negative lymphadenopathy bilateral  popliteal fossa and tarsal tunnel.   Skin:     Comments: No open lesions, lacerations or wounds noted.Interdigital spaces clean, dry and intact b/l. No erythema noted to b/l foot.  Nails normal color and trophic qualities.     Neurological:      Mental Status: She is alert.      Comments: Light touch, proprioception, and sharp/dull sensation are all intact bilaterally. Protective threshold with the [place]-Wienstein monofilament is intact bilaterally.    Psychiatric:         Behavior: Behavior is cooperative.           Assessment:      Encounter Diagnoses   Name Primary?    Plantar fasciitis Yes    Right foot pain      Plan:     Alivia was seen today for foot pain.    Diagnoses and all orders for this visit:    Plantar fasciitis    Right foot pain    Other orders  -     meloxicam (MOBIC) 15 MG tablet; Take 1 tablet (15 mg total) by mouth once daily.      I counseled the patient on her conditions, their implications and medical management.      Discussed different treatment options for heel pain. including conservative and interventional.  I gave written and verbal instructions on heel cord stretching and this was demonstrated for the patient. Patient expressed understanding. Discussed wearing appropriate shoe gear and avoiding flats, slippers, sandals, barefoot, and sockfeet. Recommended arch supports. My recommendation for OTC supports is Spenco Orthotics, ASICS tennis shoes.       Patient instructed on adequate icing techniques. Patient should ice the affected area at least once per day x 10 minutes for 10 days . I advised the  patient that extra icing would also be beneficial to ensure adequate anti inflammatory effect     Stretching handout dispensed to patient. Instructions on adequate stretching reviewed in clinic      Patient instructed on adequate icing techniques. Patient should ice the affected area at least once per day x 10 minutes for 10 days . I advised the patient that extra icing would also be  beneficial to ensure adequate anti inflammatory effect      Mobic prescribed. Patient was instructed on dosing information. Discontinue if adverse effects occur      RTC PRN

## 2025-05-13 ENCOUNTER — PATIENT MESSAGE (OUTPATIENT)
Dept: PODIATRY | Facility: CLINIC | Age: 55
End: 2025-05-13
Payer: OTHER GOVERNMENT

## 2025-05-13 RX ORDER — MELOXICAM 15 MG/1
15 TABLET ORAL DAILY
Qty: 20 TABLET | Refills: 0 | Status: SHIPPED | OUTPATIENT
Start: 2025-05-13

## 2025-06-03 ENCOUNTER — PATIENT MESSAGE (OUTPATIENT)
Dept: PODIATRY | Facility: CLINIC | Age: 55
End: 2025-06-03
Payer: OTHER GOVERNMENT

## 2025-06-12 ENCOUNTER — OFFICE VISIT (OUTPATIENT)
Dept: PODIATRY | Facility: CLINIC | Age: 55
End: 2025-06-12
Payer: OTHER GOVERNMENT

## 2025-06-12 VITALS — BODY MASS INDEX: 29.47 KG/M2 | WEIGHT: 156.06 LBS | HEIGHT: 61 IN

## 2025-06-12 DIAGNOSIS — M72.2 PLANTAR FASCIITIS: Primary | ICD-10-CM

## 2025-06-12 DIAGNOSIS — M79.671 RIGHT FOOT PAIN: ICD-10-CM

## 2025-06-12 PROCEDURE — 20550 NJX 1 TENDON SHEATH/LIGAMENT: CPT | Mod: PBBFAC,PO | Performed by: PODIATRIST

## 2025-06-12 PROCEDURE — 99999 PR PBB SHADOW E&M-EST. PATIENT-LVL III: CPT | Mod: PBBFAC,,, | Performed by: PODIATRIST

## 2025-06-12 PROCEDURE — 99213 OFFICE O/P EST LOW 20 MIN: CPT | Mod: PBBFAC,PO | Performed by: PODIATRIST

## 2025-06-12 RX ORDER — DEXAMETHASONE SODIUM PHOSPHATE 4 MG/ML
2 INJECTION, SOLUTION INTRA-ARTICULAR; INTRALESIONAL; INTRAMUSCULAR; INTRAVENOUS; SOFT TISSUE ONCE
Status: COMPLETED | OUTPATIENT
Start: 2025-06-12 | End: 2025-06-13

## 2025-06-12 RX ORDER — TRIAMCINOLONE ACETONIDE 40 MG/ML
20 INJECTION, SUSPENSION INTRA-ARTICULAR; INTRAMUSCULAR ONCE
Status: COMPLETED | OUTPATIENT
Start: 2025-06-12 | End: 2025-06-13

## 2025-06-13 RX ADMIN — TRIAMCINOLONE ACETONIDE 20 MG: 40 INJECTION, SUSPENSION INTRA-ARTICULAR; INTRAMUSCULAR at 07:06

## 2025-06-13 RX ADMIN — DEXAMETHASONE SODIUM PHOSPHATE 2 MG: 4 INJECTION, SOLUTION INTRA-ARTICULAR; INTRALESIONAL; INTRAMUSCULAR; INTRAVENOUS; SOFT TISSUE at 07:06

## 2025-06-13 NOTE — PROGRESS NOTES
Subjective:     Patient ID: Alivia Rey is a 55 y.o. female.    Chief Complaint: Plantar Fasciitis (Injection )    Alivia is a 55 y.o. female who presents to the podiatry clinic  with complaint of  right foot pain. Onset of the symptoms was several weeks ago. Precipitating event: none known. Current symptoms include: ability to bear weight, but with some pain. Aggravating factors: any weight bearing. Symptoms have progressed to a point and plateaued. Patient has had no prior foot problems. Evaluation to date: none. Treatment to date: none.     Review of Systems   Constitutional: Negative for chills.   Cardiovascular:  Negative for chest pain and claudication.   Respiratory:  Negative for cough.    Skin:  Positive for color change, dry skin and nail changes.   Musculoskeletal:  Positive for joint pain.   Gastrointestinal:  Negative for nausea.   Neurological:  Positive for paresthesias. Negative for numbness.   Psychiatric/Behavioral:  The patient is not nervous/anxious.         Objective:     Physical Exam  Constitutional:       Appearance: She is well-developed.      Comments: Oriented to time, place, and person.   Cardiovascular:      Comments: DP and PT pulses are palpable bilaterally. 3 sec capillary refill time and toes and feet are warm to touch proximally .  There is  hair growth on the feet and toes b/l. There is no edema b/l. No spider veins or varicosities present b/l.     Musculoskeletal:      Comments: Equinus noted b/l ankles with < 10 deg DF noted. MMT 5/5 in DF/PF/Inv/Ev resistance with no reproduction of pain in any direction. Passive range of motion of ankle and pedal joints is painless b/l.  Pain on palpation plantar medial right heel, no pain with ROM or MMT or medial and lateral compression of heel, - tinel's sign    Decreased right  ankle joint ROM, pain with palpation of posterior 1/3 calcaneus at region of Achilles tendon insertion. No  pain with ankle joint ROM        Feet:      Right foot:       Skin integrity: No callus or dry skin.      Left foot:      Skin integrity: No callus or dry skin.   Lymphadenopathy:      Comments: Negative lymphadenopathy bilateral popliteal fossa and tarsal tunnel.   Skin:     Comments: No open lesions, lacerations or wounds noted.Interdigital spaces clean, dry and intact b/l. No erythema noted to b/l foot.  Nails normal color and trophic qualities.     Neurological:      Mental Status: She is alert.      Comments: Light touch, proprioception, and sharp/dull sensation are all intact bilaterally. Protective threshold with the [place]-Wienstein monofilament is intact bilaterally.    Psychiatric:         Behavior: Behavior is cooperative.         Assessment:      Encounter Diagnoses   Name Primary?    Plantar fasciitis Yes    Right foot pain      Plan:     Alivia was seen today for plantar fasciitis.    Diagnoses and all orders for this visit:    Plantar fasciitis  -     Ambulatory Referral/Consult to Physical Therapy; Future    Right foot pain  -     Ambulatory Referral/Consult to Physical Therapy; Future    Other orders  -     dexAMETHasone injection 2 mg  -     triamcinolone acetonide injection 20 mg      I counseled the patient on her conditions, their implications and medical management.      Discussed different treatment options for heel pain. including conservative and interventional.  I gave written and verbal instructions on heel cord stretching and this was demonstrated for the patient. Patient expressed understanding. Discussed wearing appropriate shoe gear and avoiding flats, slippers, sandals, barefoot, and sockfeet. Recommended arch supports. My recommendation for OTC supports is Spenco Orthotics, ASICS tennis shoes.       Patient instructed on adequate icing techniques. Patient should ice the affected area at least once per day x 10 minutes for 10 days . I advised the  patient that extra icing would also be beneficial to ensure adequate anti inflammatory effect      Stretching handout dispensed to patient. Instructions on adequate stretching reviewed in clinic      Patient instructed on adequate icing techniques. Patient should ice the affected area at least once per day x 10 minutes for 10 days . I advised the patient that extra icing would also be beneficial to ensure adequate anti inflammatory effect      Mobic prescribed. Patient was instructed on dosing information. Discontinue if adverse effects occur      Patient would like injection today. Counseled the patient on the potential complications of local injection of corticosteroid including, but not limited to:  Discoloration of skin, erosion of soft tissue, and increased likelihood of rupture of a soft tissue structure (ie. Plantar fascia, muscle, tendon, ligament, or capsule in the area of injection).  Patient indicates understanding of my explanation, that any questions have been answered to patient satisfaction, and patient gives verbal consent for injection of affected area. Skin was prepped with alcohol and anesthetized with ethyl chloride.  The following mixture was injected into right medial heel  approach: 3ccs of mixture of (1cc 1% plain Lidocaine : 1cc 0.5% Marcaine plain:  0.5cc kenalog-40 : 0.5cc dexamethasone) directly into problematic areas.  Patient  tolerated the injection well.    RTC PRN

## 2025-07-15 ENCOUNTER — PATIENT MESSAGE (OUTPATIENT)
Dept: PODIATRY | Facility: CLINIC | Age: 55
End: 2025-07-15
Payer: OTHER GOVERNMENT

## 2025-07-17 ENCOUNTER — CLINICAL SUPPORT (OUTPATIENT)
Dept: REHABILITATION | Facility: HOSPITAL | Age: 55
End: 2025-07-17
Attending: PODIATRIST
Payer: OTHER GOVERNMENT

## 2025-07-17 DIAGNOSIS — M62.89 MUSCLE TIGHTNESS: Primary | ICD-10-CM

## 2025-07-17 DIAGNOSIS — M72.2 PLANTAR FASCIITIS: ICD-10-CM

## 2025-07-17 DIAGNOSIS — R68.89 DECREASED FUNCTIONAL ACTIVITY TOLERANCE: ICD-10-CM

## 2025-07-17 DIAGNOSIS — M79.671 RIGHT FOOT PAIN: ICD-10-CM

## 2025-07-17 PROCEDURE — 97161 PT EVAL LOW COMPLEX 20 MIN: CPT | Mod: PN

## 2025-07-17 PROCEDURE — 97110 THERAPEUTIC EXERCISES: CPT | Mod: PN

## 2025-07-17 NOTE — PROGRESS NOTES
Outpatient Rehab    Physical Therapy Evaluation    Patient Name: LAUREN Rey  MRN: [id number]  YOB: 1970  Encounter Date: 7/17/2025    Therapy Diagnosis:   Encounter Diagnoses   Name Primary?    Plantar fasciitis     Right foot pain     Muscle tightness Yes    Decreased functional activity tolerance      Physician: Sarah Barreto DPM    Physician Orders: Eval and Treat  Medical Diagnosis: Plantar fasciitis  Right foot pain  Surgical Diagnosis: Not applicable for this Episode   Surgical Date: Not applicable for this Episode  Days Since Last Surgery: Not applicable for this Episode    Visit # / Visits Authorized:  1 / 1  Insurance Authorization Period: 6/12/2025 to 9/14/2025  Date of Evaluation: 7/17/2025  Plan of Care Certification: 7/17/2025 to 12/31/25     Time In: 1100   Time Out: 1138  Total Time (in minutes): 38   Total Billable Time (in minutes):  38 minutes     Intake Outcome Measure for FOTO Survey    Therapist reviewed FOTO scores for LAUREN Rey on 7/17/2025.   FOTO report - see Media section or FOTO account episode details.     Intake Score: 60.9%    Precautions:       Subjective   History of Present Illness  LAUREN is a 55 y.o. female who reports to physical therapy with a chief concern of Bilateral foot pain, L>R.     The patient reports a medical diagnosis of M72.2 (ICD-10-CM) - Plantar fasciitis  M79.671 (ICD-10-CM) - Right foot pain.            History of Present Condition/Illness: Lauren is a 55 y.o. female who presents to the physical therapy clinic with c/o bilateral foot pain, L > R. She states the pain has been ongoing for months. She received an injection last month in the R foot, which has not been hurting since. She reports that when at rest, the pain is intense, but when she starts walking, it hurts less. When she first wakes up in the morning, the pain is intense as soon as she puts her feet down. She takes Tylenol, but it does not help. Resting aggravates her pain, but  when she starts moving, the pain subsides. Pt reports her L foot pain is 5/10 overall, 10/10 at rest, and 0/10 when moving around the house. She also reports limping with pain. Pt denies any prior foot problems. Patient's goal: to eliminate pain in the L foot. R foot is doing better after the injection.    Treatment History  Treatments  Previously Received Treatments: No  Currently Receiving Treatments: No    Living Arrangements  Living Situation  Living Arrangements: Spouse/significant other, Other (Comment)  Other Living Arrangements Comment: 2 kids  Support Systems: Family members        Employment  Patient does not report that: Does the patient's condition impact their ability to work?  Employment Status: Employed full-time   Works at a .      Past Medical History/Physical Systems Review:   Alivia Rey  has no past medical history on file.    Alivia Rey  has a past surgical history that includes  section, classic ().    Alivia has a current medication list which includes the following prescription(s): amoxicillin-clavulanate 875-125mg, meclizine, meloxicam, meloxicam, and meloxicam.    Review of patient's allergies indicates:  No Known Allergies     Objective   Posture                 Bilateral ankles/feet exhibit: Foot Pes Planus        Ankle/Foot Range of Motion   Right Ankle/Foot   Active (deg) Passive (deg) Pain   Dorsiflexion (KE) 16       Dorsiflexion (KF)         Plantar Flexion 42       Ankle Inversion   60     Ankle Eversion   20     Subtalar Inversion         Subtalar Eversion         Great Toe MTP Flexion         Great Toe MTP Extension         Great Toe IP Flexion             Left Ankle/Foot   Active (deg) Passive (deg) Pain   Dorsiflexion (KE) 18       Dorsiflexion (KF)         Plantar Flexion 40       Ankle Inversion   60     Ankle Eversion   30     Subtalar Inversion         Subtalar Eversion         Great Toe MTP Flexion         Great Toe MTP Extension         Great Toe  IP Flexion                            Hip Strength - Planes of Motion   Right Strength Right Pain Left Strength Left  Pain   Flexion (L2) 4+   4+     Extension 4-   4-     ABduction 4-   4-     ADduction           Internal Rotation 5   5     External Rotation 5   5         Knee Strength   Right Strength Right Pain Left Strength Left  Pain   Flexion (S2) 5   5     Prone Flexion           Extension (L3) 5   5            Ankle/Foot Strength - Planes of Motion   Right Strength Right Pain Left Strength Left  Pain   Dorsiflexion (L4) 4+   4+     Plantar Flexion (S1) 4+   4+     Inversion 4+   4+     Eversion 4+   4+     Great Toe Flexion 4   4     Great Toe Extension (L5) 4   4+     Lesser Toes Flexion           Lesser Toes Extension                     Fall Risk  Functional mobility test results suggest the patient is not: At Risk for Falls  Sit to Stand Testing      The patient completed 14 repetitions of a sit to stand transfer in 30 seconds. without UE support, no pain          Gait Analysis  Base of Support: Normal  Gait Pattern: Antalgic  Walking Speed: Decreased                   Treatment:  Therapeutic Exercise  TE 1: Long Sitting Ankle Eversion with Resistance /c RTB  TE 2: Long Sitting Ankle Inversion with Resistance /c RTB  TE 3: Long Sitting Ankle Plantar Flexion with Resistance /c RTB  TE 4: Long Sitting Ankle Dorsiflexion with Anchored Resistance /c RTB  TE 5: Standing Gastroc Stretch      Time Entry(in minutes):  PT Evaluation (Low) Time Entry: 28  Therapeutic Exercise Time Entry: 10    Assessment & Plan   Assessment  LAUREN presents with a condition of Low complexity.   Presentation of Symptoms: Stable       Functional Limitations: Activity tolerance, Completing work/school activities, Decreased ambulation distance/endurance, Functional mobility, Gait limitations, Pain with ADLs/IADLs, Painful locomotion/ambulation, Participating in leisure activities, Range of motion, Squatting  Impairments: Abnormal or  restricted range of motion, Activity intolerance, Impaired physical strength  Personal Factors Affecting Prognosis: Pain    Prognosis: Good  Assessment Details: Alivia referred to outpatient Physical Therapy with a medical diagnosis of M72.2 (ICD-10-CM) - Plantar fasciitis, M79.671 (ICD-10-CM) - Right foot pain. Upon evaluation, patient presents with decreased muscle strength, flexibility, and joint mobility. Patient also presents with increased pain in left foot, as well as impaired foot posture, balance, and gait pattern. PT will focus on addressing the patient's impairments to enhance function and assist in returning the [facility] to [facility]. The subjective and objective findings are addressed through specific goals outlined in the plan of care.      Plan  From a physical therapy perspective, the patient would benefit from: Skilled Rehab Services    Planned therapy interventions include: Therapeutic exercise, Therapeutic activities, Neuromuscular re-education, and Manual therapy.    Planned modalities to include: Biofeedback, Cryotherapy (cold pack), Electrical stimulation - attended, Electrical stimulation - passive/unattended, Thermotherapy (hot pack), and Other (Comment). Dry needling      Visit Frequency: 2 times Per Week for 12 Weeks.       This plan was discussed with Patient.   Discussion participants: Agreed Upon Plan of Care             The patient's spiritual, cultural, and educational needs were considered, and the patient is agreeable to the plan of care and goals.     Education  Education was done with Patient. The patient's learning style includes Demonstration, Listening, and Pictures/video. The patient Demonstrates understanding and Verbalizes understanding.         Patient educated on POC, goals, HEP, and consents to treatment       Goals:   Active       Functional outcome       Patient will show a significant change in FOTO patient-reported outcome tool to demonstrate subjective improvement        Start:  07/17/25    Expected End:  12/31/25            Patient will demonstrate independence in home program for support of progression       Start:  07/17/25    Expected End:  12/31/25               Pain       Patient will report pain of 1/10 demonstrating a reduction of overall pain       Start:  07/17/25    Expected End:  12/31/25               Strength       Patient will achieve bilateral hip extension strength of 5/5       Start:  07/17/25    Expected End:  12/31/25            Patient will achieve bilateral hip abduction strength of 5/5       Start:  07/17/25    Expected End:  12/31/25            Patient will achieve bilateral ankle dorsiflexion strength of 5/5       Start:  07/17/25    Expected End:  12/31/25            Patient will achieve bilateral ankle plantar flexion strength of 5/5       Start:  07/17/25    Expected End:  12/31/25            Patient will achieve bilateral ankle inversion strength of 5/5       Start:  07/17/25    Expected End:  12/31/25            Patient will achieve bilateral ankle eversion strength of 5/5       Start:  07/17/25    Expected End:  12/31/25                Sofie Arreaga, PT, DPT

## 2025-07-29 ENCOUNTER — CLINICAL SUPPORT (OUTPATIENT)
Dept: REHABILITATION | Facility: HOSPITAL | Age: 55
End: 2025-07-29
Payer: OTHER GOVERNMENT

## 2025-07-29 DIAGNOSIS — M62.89 MUSCLE TIGHTNESS: Primary | ICD-10-CM

## 2025-07-29 DIAGNOSIS — R68.89 DECREASED FUNCTIONAL ACTIVITY TOLERANCE: ICD-10-CM

## 2025-07-29 PROCEDURE — 97110 THERAPEUTIC EXERCISES: CPT | Mod: PN

## 2025-07-29 PROCEDURE — 97112 NEUROMUSCULAR REEDUCATION: CPT | Mod: PN

## 2025-07-29 NOTE — PROGRESS NOTES
Outpatient Rehab    Physical Therapy Visit    Patient Name: LAUREN Rey  MRN: [id number]  YOB: 1970  Encounter Date: 7/29/2025    Therapy Diagnosis:   Encounter Diagnoses   Name Primary?    Muscle tightness Yes    Decreased functional activity tolerance      Physician: Sarah Barreto DPM    Physician Orders: Eval and Treat  Medical Diagnosis: Plantar fasciitis  Right foot pain  Surgical Diagnosis: Not applicable for this Episode   Surgical Date: Not applicable for this Episode  Days Since Last Surgery: Not applicable for this Episode    Visit # / Visits Authorized:  1 / 20  Insurance Authorization Period: 7/29/2025 to 10/27/2025  Date of Evaluation: 7/17/2025  Plan of Care Certification: 7/17/2025 to 12/31/2025      PT/PTA:     Number of PTA visits since last PT visit:   Time In: 1400   Time Out: 1448  Total Time (in minutes): 48   Total Billable Time (in minutes):  48 minutes     FOTO:  Intake Score (%): 60.9  Survey Score 2 (%): Not applicable for this Episode  Survey Score 3 (%): 75    Precautions:         Subjective   Patient reports that she's doing well today. She did not have work and thinks that that is why she does not have any pain in the feet today. She states compliance with her HEP..  Pain reported as 0/10.      Objective            Treatment:  Therapeutic Exercise  TE 1: Long Sitting Ankle Eversion with Resistance /c RTB  TE 2: Long Sitting Ankle Inversion with Resistance /c RTB  TE 3: Long Sitting Ankle Plantar Flexion with Resistance /c RTB  TE 4: Long Sitting Ankle Dorsiflexion with Anchored Resistance /c RTB  TE 5: Ankle Inversion/Eversion Towel Slide 2x10  TE 6: Seated Plantar Fascia Stretch 3x30 sec ea  TE 7: Gastroc Stretch on Wall 3x30 sec  TE 8: Soleus Stretch on Wall 3x30 sec  TE 10: +Recumbent bike 10 minutes  Balance/Neuromuscular Re-Education  NMR 1: +Towel Scrunches 2x10 ea  NMR 2: +Seated Plantar Fascia Mobilization with Small Ball 2x1 min ea  NMR 3: +Eccentric Bent  Knee Calf Raise on Step 2x10  NMR 4: +Eccentric Heel Lowering on Step (knee straight) 2x10    Time Entry(in minutes):  Neuromuscular Re-Education Time Entry: 20  Therapeutic Exercise Time Entry: 28    Assessment & Plan   Assessment: Patient presents to PT today with no symptom irritability. Introduced exercises to improve her ankle strength and flexibility, and foot intrinsics strength and flexibility. Patient required mod VC and demonstration for proper form and optimal muscle activation. She did not have any provocation of pain during today's session. However, noticeable muscle fatigue, requiring rest breaks. Will monitor patient reponse to today's session at her next f/u and progress her as tolerated.   Evaluation/Treatment Tolerance: Patient tolerated treatment well    The patient will continue to benefit from skilled outpatient physical therapy in order to address the deficits listed in the problem list on the initial evaluation, provide patient and family education, and maximize the patients level of independence in the home and community environments.     The patient's spiritual, cultural, and educational needs were considered, and the patient is agreeable to the plan of care and goals.           Plan: improve foot instrinsics/ankle strength and flexibility.    Goals:   Active       Functional outcome       Patient will show a significant change in FOTO patient-reported outcome tool to demonstrate subjective improvement       Start:  07/17/25    Expected End:  12/31/25            Patient will demonstrate independence in home program for support of progression       Start:  07/17/25    Expected End:  12/31/25               Pain       Patient will report pain of 1/10 demonstrating a reduction of overall pain       Start:  07/17/25    Expected End:  12/31/25               Strength       Patient will achieve bilateral hip extension strength of 5/5       Start:  07/17/25    Expected End:  12/31/25             Patient will achieve bilateral hip abduction strength of 5/5       Start:  07/17/25    Expected End:  12/31/25            Patient will achieve bilateral ankle dorsiflexion strength of 5/5       Start:  07/17/25    Expected End:  12/31/25            Patient will achieve bilateral ankle plantar flexion strength of 5/5       Start:  07/17/25    Expected End:  12/31/25            Patient will achieve bilateral ankle inversion strength of 5/5       Start:  07/17/25    Expected End:  12/31/25            Patient will achieve bilateral ankle eversion strength of 5/5       Start:  07/17/25    Expected End:  12/31/25                Sofie Arreaga PT, DPT

## 2025-07-29 NOTE — PATIENT INSTRUCTIONS
"[facility]     Prepared By: [employer]  Home Exercise Program  Login Instructions  Login  To access your Home Exercise Program:  Scan     Or     Visit  [email or web address]  Access Code: [email or web address]  Two Ways to Access  Use the [employer] donnell  Access your home exercise program with our mobile donnell for iOS and Android.   Search The [employer] Store or Google Play for "Socialcast".  Open in your browser  To access your home exercise programs.  By Accessing Online You Can  View your exercise videos  Interactive HD videos guide you with easy to follow instructions.     Learn about your condition  Gain a deeper understanding of your condition and the road to health recovery.     Track your progress  Keep track of your activity and progress throughout treatment and post care.  Disclaimer: This program provides exercises related to preventative maintenance OR to your condition that you can perform at home. As there is a risk of injury with any activity, use caution when performing exercises. If you experience any pain or discomfort, discontinue the exercises and contact your healthcare provider.  Page 1  Prepared By: Esthere Isidore [facility]  Long Sitting Ankle Eversion with Resistance    reps: 10 sets: 3 hold: 5 daily: 1   weekly: 7        Exercise image step1     Exercise image step2    Setup    Begin sitting upright on the floor with a resistance band secured around one foot. The resistance band should be looped around the bottom of your other foot with the end held in your hand.  Movement    Move the foot with the resistance band away from the other foot by rotating your ankle outward, then slowly return to the starting position and repeat.  Tip    Make sure to avoid any hip movement.      Long Sitting Ankle Inversion with Resistance    reps: 10 sets: 3 hold: 5 daily: 1   weekly: 7        Exercise image step1     Exercise image step2    Setup    Begin sitting upright on the floor with your legs " crossed and a resistance band secured around one foot. The resistance band should be looped around the bottom of your other foot with the end held in your hand.  Movement    Move the foot with the resistance band away from the other foot by rotating your ankle inward, then slowly return to the starting position and repeat.  Tip    Make sure to avoid any hip movement.      Disclaimer: This program provides exercises related to preventative maintenance OR to your condition that you can perform at home. As there is a risk of injury with any activity, use caution when performing exercises. If you experience any pain or discomfort, discontinue the exercises and contact your healthcare provider.  Login: [email or web address]    Access Code: [email or web address]    Date printed: 07/29/2025Page 2  Long Sitting Ankle Plantar Flexion with Resistance    reps: 10 sets: 3 hold: 5 daily: 1   weekly: 7        Exercise image step1     Exercise image step2    Setup    Begin sitting upright on the floor with your legs straight and a resistance band secured around one foot. The band should be looped around the bottom of your foot with the end held in your hand.  Movement    Bend your foot away from your body, creating further tension in the band.  Tip    Make sure to keep your toes relaxed and maintain good sitting posture.       Long Sitting Ankle Dorsiflexion with Anchored Resistance    reps: 10 sets: 3 hold: 5 daily: 1   weekly: 7        Exercise image step1     Exercise image step2    Setup    Begin sitting upright on the floor with your legs straight and a resistance band secured around one foot. You should be facing the anchor point.  Movement    Pull the top of your foot toward your body, creating further tension in the band.  Tip    Make sure to keep your toes relaxed and maintain good sitting posture.       Disclaimer: This program provides exercises related to preventative maintenance OR to your condition that you can perform at home. As  there is a risk of injury with any activity, use caution when performing exercises. If you experience any pain or discomfort, discontinue the exercises and contact your healthcare provider.  Login: [email or web address]    Access Code: [email or web address]    Date printed: 07/29/2025Page 3  Towel Scrunches    reps: 10 sets: 3 hold: 5 daily: 1   weekly: 7        Exercise image step1     Exercise image step2    Setup    Begin in a staggered standing position with your forward foot resting on a flat towel, and the knee slightly bent.  Movement    Keep your back knee straight. Use your toes to scrunch up the towel.  Tip    Make sure to keep the rest of your foot in contact with the ground.      Ankle Inversion Eversion Towel Slide    reps: 10 sets: 3 hold: 5 daily: 1   weekly: 7        Exercise image step1     Exercise image step2    Setup  Begin sitting upright on a chair with one foot resting on a towel.  Movement  Slowly turn your foot inward using your heel as a pivot, then slide it outward, and repeat.  Tip  Make sure to keep your foot on the floor during the exercise.      Disclaimer: This program provides exercises related to preventative maintenance OR to your condition that you can perform at home. As there is a risk of injury with any activity, use caution when performing exercises. If you experience any pain or discomfort, discontinue the exercises and contact your healthcare provider.  Login: [email or web address]    Access Code: [email or web address]    Date printed: 07/29/2025Page 4  Ankle Inversion Eversion Towel Slide    reps: 10 sets: 3 hold: 5 daily: 1   weekly: 7        Exercise image step1     Exercise image step2    Setup  Begin sitting upright on a chair with one foot resting on a towel.  Movement  Slowly turn your foot inward using your heel as a pivot, then slide it outward, and repeat.  Tip  Make sure to keep your foot on the floor during the exercise.      Eccentric Heel Lowering on Step    reps: 10 sets: 3 hold: 5 daily:  1   weekly: 7        Exercise image step1     Exercise image step2    Setup  Begin standing on a small step or platform with your heels off the edge, holding onto a stable object for balance.  Movement  Raise both heels up, then lift one foot off the platform and slowly lower your other heel. Repeat this movement.  Tip  Make sure to maintain your balance and keep your back straight throughout the exercise.      Disclaimer: This program provides exercises related to preventative maintenance OR to your condition that you can perform at home. As there is a risk of injury with any activity, use caution when performing exercises. If you experience any pain or discomfort, discontinue the exercises and contact your healthcare provider.  Login: [email or web address]    Access Code: [email or web address]    Date printed: 07/29/2025Page 5  Eccentric Bent Knee Calf Raise on Step    reps: 10 sets: 3 hold: 5 daily: 1   weekly: 7        Exercise image step1     Exercise image step2    Setup  Begin standing on a small step or platform with your heels off the edge, holding onto a stable object for balance.  Movement  Bend your knees, then raise both heels up, lift one leg off the step, and lower down slowly on your other foot. Repeat these movements.  Tip  Make sure to keep your back straight during the exercise.      Seated Plantar Fascia Mobilization with Small Ball    sets: 1 hold: 2 minutes daily: 1 weekly: 7     Exercise image step1     Exercise image step2    Setup  Begin sitting in a chair with your foot resting on a small ball.  Movement  Gently roll the middle of your foot forward and backward over the ball, in between the ball of your foot and your heel.  Tip  Make sure to use just enough pressure that you feel a stretch but no pain.      Disclaimer: This program provides exercises related to preventative maintenance OR to your condition that you can perform at home. As there is a risk of injury with any activity, use caution when  performing exercises. If you experience any pain or discomfort, discontinue the exercises and contact your healthcare provider.  Login: [email or web address]    Access Code: [email or web address]    Date printed: 07/29/2025Page 6  Seated Plantar Fascia Stretch    sets: 2 hold: 30 daily: 1 weekly: 7     Exercise image step1     Exercise image step2    Setup  Begin sitting in a chair with one leg crossed over your other knee. Use one hand to hold your ankle, and the other to hold your toes.  Movement  Gently pull your toes backward until you feel a stretch in the bottom of your foot and hold.  Tip  Make sure to keep the stretch slow and controlled.      Gastroc Stretch on Wall    reps: 10 sets: 3 daily: 1 weekly: 7     Exercise image step1     Exercise image step2    Setup    Begin in a standing upright position in front of a wall.  Movement  Place your hands on the wall and extend one leg straight backward, bending your front leg, until you feel a stretch in the calf of your back leg and hold.  Tip   Make sure to keep your heels on the ground and back knee straight during the stretch.      Disclaimer: This program provides exercises related to preventative maintenance OR to your condition that you can perform at home. As there is a risk of injury with any activity, use caution when performing exercises. If you experience any pain or discomfort, discontinue the exercises and contact your healthcare provider.  Login: [email or web address]    Access Code: [email or web address]    Date printed: 07/29/2025Page 7  Soleus Stretch on Wall    reps: 10 sets: 3 daily: 1 weekly: 7     Exercise image step1    Setup  Begin in a standing upright position in front of a wall.  Movement  Place your hands on the wall and extend one leg backward with your knee bent. Lean forward into the wall, until you feel a stretch in your lower calf and hold.  Tip  Make sure to keep your heels on the ground and back knee bent during the stretch.      Disclaimer: This program  provides exercises related to preventative maintenance OR to your condition that you can perform at home. As there is a risk of injury with any activity, use caution when performing exercises. If you experience any pain or discomfort, discontinue the exercises and contact your healthcare provider.  Login: [email or web address]    Access Code: [email or web address]    Date printed: 07/29/2025Page 8  Long Sitting Ankle Eversion with Resistance    reps: 10 sets: 3   hold: 5 daily: 1   weekly: 7       Long Sitting Ankle Inversion with Resistance    reps: 10 sets: 3   hold: 5 daily: 1   weekly: 7       Long Sitting Ankle Plantar Flexion with Resistance    reps: 10 sets: 3   hold: 5 daily: 1   weekly: 7       Long Sitting Ankle Dorsiflexion with Anchored Resistance    reps: 10 sets: 3   hold: 5 daily: 1   weekly: 7       Towel Scrunches    reps: 10 sets: 3   hold: 5 daily: 1   weekly: 7         Disclaimer: This program provides exercises related to preventative maintenance OR to your condition that you can perform at home. As there is a risk of injury with any activity, use caution when performing exercises. If you experience any pain or discomfort, discontinue the exercises and contact your healthcare provider.  Login: [email or web address]    Access Code: [email or web address]    Date printed: 07/29/2025Page 9  Ankle Inversion Eversion Towel Slide    reps: 10 sets: 3   hold: 5 daily: 1   weekly: 7       Ankle Inversion Eversion Towel Slide    reps: 10 sets: 3   hold: 5 daily: 1   weekly: 7       Eccentric Heel Lowering on Step    reps: 10 sets: 3   hold: 5 daily: 1   weekly: 7       Eccentric Bent Knee Calf Raise on Step    reps: 10 sets: 3   hold: 5 daily: 1   weekly: 7       Seated Plantar Fascia Mobilization with Small Ball    sets: 1 hold: 2 minutes   daily: 1 weekly: 7        Disclaimer: This program provides exercises related to preventative maintenance OR to your condition that you can perform at home. As there is a risk of injury with any  activity, use caution when performing exercises. If you experience any pain or discomfort, discontinue the exercises and contact your healthcare provider.  Login: [email or web address]    Access Code: [email or web address]    Date printed: 07/29/2025Page 10  Seated Plantar Fascia Stretch    sets: 2 hold: 30   daily: 1 weekly: 7      Gastroc Stretch on Wall    reps: 10 sets: 3   daily: 1 weekly: 7      Soleus Stretch on Wall   reps: 10 sets: 3   daily: 1 weekly: 7        Disclaimer: This program provides exercises related to preventative maintenance OR to your condition that you can perform at home. As there is a risk of injury with any activity, use caution when performing exercises. If you experience any pain or discomfort, discontinue the exercises and contact your healthcare provider.